# Patient Record
Sex: FEMALE | Race: WHITE | Employment: FULL TIME | URBAN - METROPOLITAN AREA
[De-identification: names, ages, dates, MRNs, and addresses within clinical notes are randomized per-mention and may not be internally consistent; named-entity substitution may affect disease eponyms.]

---

## 2022-08-25 ENCOUNTER — APPOINTMENT (OUTPATIENT)
Dept: CT IMAGING | Age: 47
End: 2022-08-25
Attending: EMERGENCY MEDICINE
Payer: COMMERCIAL

## 2022-08-25 ENCOUNTER — HOSPITAL ENCOUNTER (EMERGENCY)
Age: 47
Discharge: HOME | End: 2022-08-25
Attending: EMERGENCY MEDICINE
Payer: COMMERCIAL

## 2022-08-25 VITALS
OXYGEN SATURATION: 98 % | WEIGHT: 160.94 LBS | DIASTOLIC BLOOD PRESSURE: 85 MMHG | HEART RATE: 95 BPM | TEMPERATURE: 98.6 F | RESPIRATION RATE: 18 BRPM | SYSTOLIC BLOOD PRESSURE: 138 MMHG

## 2022-08-25 DIAGNOSIS — R20.2 TINGLING OF LEFT UPPER EXTREMITY: ICD-10-CM

## 2022-08-25 DIAGNOSIS — R51.9 NONINTRACTABLE EPISODIC HEADACHE, UNSPECIFIED HEADACHE TYPE: Primary | ICD-10-CM

## 2022-08-25 LAB
ALBUMIN SERPL-MCNC: 4.2 G/DL (ref 3.5–5)
ALBUMIN/GLOB SERPL: 1.2 {RATIO} (ref 1.1–2.2)
ALP SERPL-CCNC: 54 U/L (ref 45–117)
ALT SERPL-CCNC: 62 U/L (ref 12–78)
ANION GAP SERPL CALC-SCNC: 11 MMOL/L (ref 5–15)
AST SERPL-CCNC: 30 U/L (ref 15–37)
BASOPHILS # BLD: 0.1 K/UL (ref 0–0.1)
BASOPHILS NFR BLD: 1 % (ref 0–1)
BILIRUB SERPL-MCNC: 0.7 MG/DL (ref 0.2–1)
BUN SERPL-MCNC: 9 MG/DL (ref 6–20)
BUN/CREAT SERPL: 13 (ref 12–20)
CALCIUM SERPL-MCNC: 8.6 MG/DL (ref 8.5–10.1)
CHLORIDE SERPL-SCNC: 103 MMOL/L (ref 97–108)
CO2 SERPL-SCNC: 23 MMOL/L (ref 21–32)
CREAT SERPL-MCNC: 0.72 MG/DL (ref 0.55–1.02)
DIFFERENTIAL METHOD BLD: ABNORMAL
EOSINOPHIL # BLD: 0.1 K/UL (ref 0–0.4)
EOSINOPHIL NFR BLD: 1 % (ref 0–7)
ERYTHROCYTE [DISTWIDTH] IN BLOOD BY AUTOMATED COUNT: 12.7 % (ref 11.5–14.5)
GLOBULIN SER CALC-MCNC: 3.6 G/DL (ref 2–4)
GLUCOSE SERPL-MCNC: 88 MG/DL (ref 65–100)
HCT VFR BLD AUTO: 42.5 % (ref 35–47)
HGB BLD-MCNC: 14 G/DL (ref 11.5–16)
IMM GRANULOCYTES # BLD AUTO: 0.1 K/UL (ref 0–0.04)
IMM GRANULOCYTES NFR BLD AUTO: 1 % (ref 0–0.5)
LYMPHOCYTES # BLD: 2 K/UL (ref 0.8–3.5)
LYMPHOCYTES NFR BLD: 22 % (ref 12–49)
MCH RBC QN AUTO: 31 PG (ref 26–34)
MCHC RBC AUTO-ENTMCNC: 32.9 G/DL (ref 30–36.5)
MCV RBC AUTO: 94.2 FL (ref 80–99)
MONOCYTES # BLD: 0.7 K/UL (ref 0–1)
MONOCYTES NFR BLD: 8 % (ref 5–13)
NEUTS SEG # BLD: 6.1 K/UL (ref 1.8–8)
NEUTS SEG NFR BLD: 67 % (ref 32–75)
NRBC # BLD: 0 K/UL (ref 0–0.01)
NRBC BLD-RTO: 0 PER 100 WBC
PLATELET # BLD AUTO: 327 K/UL (ref 150–400)
PMV BLD AUTO: 10.1 FL (ref 8.9–12.9)
POTASSIUM SERPL-SCNC: 3.7 MMOL/L (ref 3.5–5.1)
PROT SERPL-MCNC: 7.8 G/DL (ref 6.4–8.2)
RBC # BLD AUTO: 4.51 M/UL (ref 3.8–5.2)
SODIUM SERPL-SCNC: 137 MMOL/L (ref 136–145)
TROPONIN-HIGH SENSITIVITY: 4 NG/L (ref 0–51)
WBC # BLD AUTO: 9.1 K/UL (ref 3.6–11)

## 2022-08-25 PROCEDURE — 70450 CT HEAD/BRAIN W/O DYE: CPT

## 2022-08-25 PROCEDURE — 84484 ASSAY OF TROPONIN QUANT: CPT

## 2022-08-25 PROCEDURE — 36415 COLL VENOUS BLD VENIPUNCTURE: CPT

## 2022-08-25 PROCEDURE — 99284 EMERGENCY DEPT VISIT MOD MDM: CPT

## 2022-08-25 PROCEDURE — 93005 ELECTROCARDIOGRAM TRACING: CPT

## 2022-08-25 PROCEDURE — 85025 COMPLETE CBC W/AUTO DIFF WBC: CPT

## 2022-08-25 PROCEDURE — 80053 COMPREHEN METABOLIC PANEL: CPT

## 2022-08-25 NOTE — ED PROVIDER NOTES
Date of Service:  2022    Patient:  Magdalena Mace    Chief Complaint:  Hypertension and Numbness       HPI:  Magdalena Mace is a 50 y.o.  female who presents for evaluation of high blood pressure and tingling. Patient states that for the last couple days she has been having more migraines than she usually does. Last migraine was at 3 AM.  She states that today she felt a little bit of chest tightness and then later throughout the day felt a tingling sensation in her arm described as the feeling like somebody put a rubber band around her arm and the upper arm now in the lower arm. Currently only in the area where the blood pressure cuff is. Patient says because she was feeling so odd, she went to [facility] to check her blood pressure given an abnormally high reading at home however the blood pressure reading at [facility] corresponded with her home reading. Patient states that she still feels a little odd but denies any current chest pain shortness of breath abdominal pain headaches fevers chills or other acute complaints. Past Medical History:   Diagnosis Date    GERD (gastroesophageal reflux disease)     Ill-defined condition     HHT- heriditary hemorrhagic telangiectasia    Migraine        Past Surgical History:   Procedure Laterality Date    HX GYN       X3    HX OTHER SURGICAL      bilateral impectomy    HX OTHER SURGICAL      internal bleeding after breast surgery         Family History:   Problem Relation Age of Onset    Cancer Paternal Grandmother         colon       Social History     Socioeconomic History    Marital status:      Spouse name: Not on file    Number of children: Not on file    Years of education: Not on file    Highest education level: Not on file   Occupational History    Not on file   Tobacco Use    Smoking status: Never    Smokeless tobacco: Never   Substance and Sexual Activity    Alcohol use:  Yes     Alcohol/week: 1.7 standard drinks     Types: 2 drink(s) per week    Drug use: No    Sexual activity: Not on file   Other Topics Concern    Not on file   Social History Narrative    Not on file     Social Determinants of Health     Financial Resource Strain: Not on file   Food Insecurity: Not on file   Transportation Needs: Not on file   Physical Activity: Not on file   Stress: Not on file   Social Connections: Not on file   Intimate Partner Violence: Not on file   Housing Stability: Not on file         ALLERGIES: Patient has no known allergies. Review of Systems   Respiratory:  Positive for chest tightness. Cardiovascular:  Positive for chest pain. Neurological:  Positive for numbness (tingling) and headaches. Negative for facial asymmetry. All other systems reviewed and are negative. Vitals:    08/25/22 1604   BP: (!) 153/86   Pulse: 95   Resp: 18   Temp: 98.6 °F (37 °C)   SpO2: 96%   Weight: 73 kg (160 lb 15 oz)            Physical Exam  Vitals and nursing note reviewed. Exam conducted with a chaperone present. Constitutional:       General: She is not in acute distress. Appearance: Normal appearance. HENT:      Head: Normocephalic and atraumatic. Nose: Nose normal.      Mouth/Throat:      Mouth: Mucous membranes are moist.   Eyes:      General: No scleral icterus. Cardiovascular:      Rate and Rhythm: Normal rate and regular rhythm. Pulmonary:      Effort: Pulmonary effort is normal.      Breath sounds: Normal breath sounds. Abdominal:      General: Abdomen is flat. Musculoskeletal:         General: No deformity. Skin:     General: Skin is warm. Capillary Refill: Capillary refill takes less than 2 seconds. Neurological:      Mental Status: She is alert and oriented to person, place, and time. Motor: No weakness.       Gait: Gait normal.   Psychiatric:         Mood and Affect: Mood normal.        [facility]    ED Course as of 08/25/22 1906   Thu Aug 25, 2022   1605 EKG 1558  Normal sinus rhythm at 86 bpm with normal axis and normal intervals. [GG]      ED Course User Index  [GG] Nav Caballero DO     VITAL SIGNS:  Patient Vitals for the past 4 hrs:   Temp Pulse Resp BP SpO2   08/25/22 1815 -- -- -- -- 98 %   08/25/22 1630 -- -- -- 138/85 99 %   08/25/22 1615 -- -- -- -- 98 %   08/25/22 1607 -- -- -- (!) 153/86 99 %   08/25/22 1604 98.6 °F (37 °C) 95 18 (!) 153/86 96 %         LABS:  Recent Results (from the past 6 hour(s))   EKG, 12 LEAD, INITIAL    Collection Time: 08/25/22  3:58 PM   Result Value Ref Range    Ventricular Rate 86 BPM    Atrial Rate 86 BPM    P-R Interval 134 ms    QRS Duration 86 ms    Q-T Interval 372 ms    QTC Calculation (Bezet) 445 ms    Calculated P Axis 28 degrees    Calculated R Axis 3 degrees    Calculated T Axis 27 degrees    Diagnosis       Normal sinus rhythm  Normal ECG  No previous ECGs available     CBC WITH AUTOMATED DIFF    Collection Time: 08/25/22  4:11 PM   Result Value Ref Range    WBC 9.1 3.6 - 11.0 K/uL    RBC 4.51 3.80 - 5.20 M/uL    HGB 14.0 11.5 - 16.0 g/dL    HCT 42.5 35.0 - 47.0 %    MCV 94.2 80.0 - 99.0 FL    MCH 31.0 26.0 - 34.0 PG    MCHC 32.9 30.0 - 36.5 g/dL    RDW 12.7 11.5 - 14.5 %    PLATELET 107 027 - 554 K/uL    MPV 10.1 8.9 - 12.9 FL    NRBC 0.0 0  WBC    ABSOLUTE NRBC 0.00 0.00 - 0.01 K/uL    NEUTROPHILS 67 32 - 75 %    LYMPHOCYTES 22 12 - 49 %    MONOCYTES 8 5 - 13 %    EOSINOPHILS 1 0 - 7 %    BASOPHILS 1 0 - 1 %    IMMATURE GRANULOCYTES 1 (H) 0.0 - 0.5 %    ABS. NEUTROPHILS 6.1 1.8 - 8.0 K/UL    ABS. LYMPHOCYTES 2.0 0.8 - 3.5 K/UL    ABS. MONOCYTES 0.7 0.0 - 1.0 K/UL    ABS. EOSINOPHILS 0.1 0.0 - 0.4 K/UL    ABS. BASOPHILS 0.1 0.0 - 0.1 K/UL    ABS. IMM.  GRANS. 0.1 (H) 0.00 - 0.04 K/UL    DF AUTOMATED     METABOLIC PANEL, COMPREHENSIVE    Collection Time: 08/25/22  4:11 PM   Result Value Ref Range    Sodium 137 136 - 145 mmol/L    Potassium 3.7 3.5 - 5.1 mmol/L    Chloride 103 97 - 108 mmol/L    CO2 23 21 - 32 mmol/L    Anion gap 11 5 - 15 mmol/L    Glucose 88 65 - 100 mg/dL    BUN 9 6 - 20 MG/DL    Creatinine 0.72 0.55 - 1.02 MG/DL    BUN/Creatinine ratio 13 12 - 20      GFR est AA >60 >60 ml/min/1.73m2    GFR est non-AA >60 >60 ml/min/1.73m2    Calcium 8.6 8.5 - 10.1 MG/DL    Bilirubin, total 0.7 0.2 - 1.0 MG/DL    ALT (SGPT) 62 12 - 78 U/L    AST (SGOT) 30 15 - 37 U/L    Alk. phosphatase 54 45 - 117 U/L    Protein, total 7.8 6.4 - 8.2 g/dL    Albumin 4.2 3.5 - 5.0 g/dL    Globulin 3.6 2.0 - 4.0 g/dL    A-G Ratio 1.2 1.1 - 2.2     TROPONIN-HIGH SENSITIVITY    Collection Time: 08/25/22  4:11 PM   Result Value Ref Range    Troponin-High Sensitivity 4 0 - 51 ng/L        IMAGING:  CT HEAD WO CONT   Final Result   No acute intracranial abnormality. Medications During Visit:  Medications - No data to display      DECISION MAKING:  Karis Kumar is a 50 y.o. female who comes in as above. Here, patient is well-appearing. Normal neurological exam.  Symptoms seem to be resolving. She is also developing a slight headache consistent with her previous migraines. Most likely this is complex migraine type symptoms that she has no neurological symptoms currently has normal CT, work-up, sensation and otherwise unremarkable/reassuring exam.  Will discharge home with instructions to follow-up with PCP, return as needed. IMPRESSION:  1. Nonintractable episodic headache, unspecified headache type    2. Tingling of left upper extremity        DISPOSITION:  Discharged      Discharge Medication List as of 8/25/2022  6:19 PM           Follow-up Information       Follow up With Specialties Details Why Contact Info    Keily Tovar MD Internal Medicine Physician Schedule an appointment as soon as possible for a visit    Ninoska Kramer [phone]                The patient is asked to follow-up with their primary care provider in the next several days. They are to call tomorrow for an appointment.   The patient is asked to return promptly for any increased concerns or worsening of symptoms. They can return to this emergency department or any other emergency department.     Procedures

## 2022-08-25 NOTE — ED NOTES
Pain assessment on discharge was   Condition Stable  Patient discharged to home  Patient education was completed  Education taught to patient  Teaching method used was handout and verbal  Understanding of teaching was good  Patient was discharged ambulatory  Discharged with self  Valuables were given to: patient remained in possession of belongings during stay

## 2022-08-25 NOTE — ED TRIAGE NOTES
Triage Note: Patient arrives to ER complaining of left arm numbness, and hypertension. Patient reports she began to experiencing left arm numbness 0800 this morning, and progressively became worst throughout the day. Negative to facial drop, arm drift, leg drift, slurred speech. Reports two days ago she experienced a dizzy spell.

## 2022-08-26 LAB
ATRIAL RATE: 86 BPM
CALCULATED P AXIS, ECG09: 28 DEGREES
CALCULATED R AXIS, ECG10: 3 DEGREES
CALCULATED T AXIS, ECG11: 27 DEGREES
DIAGNOSIS, 93000: NORMAL
P-R INTERVAL, ECG05: 134 MS
Q-T INTERVAL, ECG07: 372 MS
QRS DURATION, ECG06: 86 MS
QTC CALCULATION (BEZET), ECG08: 445 MS
VENTRICULAR RATE, ECG03: 86 BPM

## 2023-11-06 ENCOUNTER — HOSPITAL ENCOUNTER (OUTPATIENT)
Age: 48
Discharge: HOME | End: 2023-11-09

## 2023-11-06 DIAGNOSIS — Z13.6 SCREENING FOR HEART DISEASE: ICD-10-CM

## 2023-11-06 PROCEDURE — 75571 CT HRT W/O DYE W/CA TEST: CPT

## 2023-11-06 PROCEDURE — 9900000021 VAS SCREENING (CAROTID/ABDOMINAL/ABI LTD)

## 2023-11-07 LAB
VAS AORTA DIST AP: 1.3 CM
VAS AORTA MID AP: 1.4 CM
VAS AORTA PROX AP: 1.7 CM
VAS LEFT ABI: 1.33
VAS LEFT ARM BP: 122 MMHG
VAS LEFT DORSALIS PEDIS BP: 164 MMHG
VAS LEFT ICA/CCA PSV: 1.1
VAS LEFT PTA BP: 168 MMHG
VAS RIGHT ABI: 1.17
VAS RIGHT ARM BP: 126 MMHG
VAS RIGHT DORSALIS PEDIS BP: 142 MMHG
VAS RIGHT ICA/CCA PSV: 1.1
VAS RIGHT PTA BP: 148 MMHG